# Patient Record
Sex: FEMALE | Race: WHITE | Employment: UNEMPLOYED | URBAN - NONMETROPOLITAN AREA
[De-identification: names, ages, dates, MRNs, and addresses within clinical notes are randomized per-mention and may not be internally consistent; named-entity substitution may affect disease eponyms.]

---

## 2021-05-06 ENCOUNTER — APPOINTMENT (OUTPATIENT)
Dept: GENERAL RADIOLOGY | Age: 41
End: 2021-05-06
Payer: COMMERCIAL

## 2021-05-06 ENCOUNTER — APPOINTMENT (OUTPATIENT)
Dept: CT IMAGING | Age: 41
End: 2021-05-06
Payer: COMMERCIAL

## 2021-05-06 ENCOUNTER — HOSPITAL ENCOUNTER (EMERGENCY)
Age: 41
Discharge: HOME | End: 2021-05-06
Attending: EMERGENCY MEDICINE
Payer: COMMERCIAL

## 2021-05-06 VITALS
HEART RATE: 67 BPM | OXYGEN SATURATION: 100 % | DIASTOLIC BLOOD PRESSURE: 94 MMHG | HEIGHT: 67 IN | BODY MASS INDEX: 29.82 KG/M2 | RESPIRATION RATE: 16 BRPM | WEIGHT: 190 LBS | TEMPERATURE: 98.1 F | SYSTOLIC BLOOD PRESSURE: 140 MMHG

## 2021-05-06 DIAGNOSIS — G51.0 BELL'S PALSY: Primary | ICD-10-CM

## 2021-05-06 LAB
ALBUMIN SERPL-MCNC: 4.6 G/DL (ref 3.5–5.1)
ALP BLD-CCNC: 61 U/L (ref 38–126)
ALT SERPL-CCNC: 11 U/L (ref 11–66)
ANION GAP SERPL CALCULATED.3IONS-SCNC: 10 MEQ/L (ref 8–16)
AST SERPL-CCNC: 16 U/L (ref 5–40)
BASOPHILS # BLD: 0.6 %
BASOPHILS ABSOLUTE: 0 THOU/MM3 (ref 0–0.1)
BILIRUB SERPL-MCNC: 0.3 MG/DL (ref 0.3–1.2)
BILIRUBIN DIRECT: < 0.2 MG/DL (ref 0–0.3)
BUN BLDV-MCNC: 10 MG/DL (ref 7–22)
CALCIUM SERPL-MCNC: 9.5 MG/DL (ref 8.5–10.5)
CHLORIDE BLD-SCNC: 103 MEQ/L (ref 98–111)
CHP ED QC CHECK: NORMAL
CO2: 25 MEQ/L (ref 23–33)
CREAT SERPL-MCNC: 0.8 MG/DL (ref 0.4–1.2)
EOSINOPHIL # BLD: 1.7 %
EOSINOPHILS ABSOLUTE: 0.1 THOU/MM3 (ref 0–0.4)
ERYTHROCYTE [DISTWIDTH] IN BLOOD BY AUTOMATED COUNT: 13.4 % (ref 11.5–14.5)
ERYTHROCYTE [DISTWIDTH] IN BLOOD BY AUTOMATED COUNT: 42.5 FL (ref 35–45)
GFR SERPL CREATININE-BSD FRML MDRD: 79 ML/MIN/1.73M2
GLUCOSE BLD-MCNC: 143 MG/DL
GLUCOSE BLD-MCNC: 143 MG/DL (ref 70–108)
GLUCOSE BLD-MCNC: 143 MG/DL (ref 70–108)
HCT VFR BLD CALC: 41.8 % (ref 37–47)
HEMOGLOBIN: 13.1 GM/DL (ref 12–16)
IMMATURE GRANS (ABS): 0.03 THOU/MM3 (ref 0–0.07)
IMMATURE GRANULOCYTES: 0.5 %
LYMPHOCYTES # BLD: 31.3 %
LYMPHOCYTES ABSOLUTE: 2 THOU/MM3 (ref 1–4.8)
MCH RBC QN AUTO: 27.2 PG (ref 26–33)
MCHC RBC AUTO-ENTMCNC: 31.3 GM/DL (ref 32.2–35.5)
MCV RBC AUTO: 86.9 FL (ref 81–99)
MONOCYTES # BLD: 6.7 %
MONOCYTES ABSOLUTE: 0.4 THOU/MM3 (ref 0.4–1.3)
NUCLEATED RED BLOOD CELLS: 0 /100 WBC
OSMOLALITY CALCULATION: 277.2 MOSMOL/KG (ref 275–300)
PLATELET # BLD: 255 THOU/MM3 (ref 130–400)
PMV BLD AUTO: 10 FL (ref 9.4–12.4)
POTASSIUM REFLEX MAGNESIUM: 4.3 MEQ/L (ref 3.5–5.2)
RBC # BLD: 4.81 MILL/MM3 (ref 4.2–5.4)
SEG NEUTROPHILS: 59.2 %
SEGMENTED NEUTROPHILS ABSOLUTE COUNT: 3.8 THOU/MM3 (ref 1.8–7.7)
SODIUM BLD-SCNC: 138 MEQ/L (ref 135–145)
TOTAL PROTEIN: 7.4 G/DL (ref 6.1–8)
TROPONIN T: < 0.01 NG/ML
WBC # BLD: 6.4 THOU/MM3 (ref 4.8–10.8)

## 2021-05-06 PROCEDURE — 80048 BASIC METABOLIC PNL TOTAL CA: CPT

## 2021-05-06 PROCEDURE — 82948 REAGENT STRIP/BLOOD GLUCOSE: CPT

## 2021-05-06 PROCEDURE — 36415 COLL VENOUS BLD VENIPUNCTURE: CPT

## 2021-05-06 PROCEDURE — 6370000000 HC RX 637 (ALT 250 FOR IP): Performed by: STUDENT IN AN ORGANIZED HEALTH CARE EDUCATION/TRAINING PROGRAM

## 2021-05-06 PROCEDURE — 99285 EMERGENCY DEPT VISIT HI MDM: CPT

## 2021-05-06 PROCEDURE — 70450 CT HEAD/BRAIN W/O DYE: CPT

## 2021-05-06 PROCEDURE — 71045 X-RAY EXAM CHEST 1 VIEW: CPT

## 2021-05-06 PROCEDURE — 85025 COMPLETE CBC W/AUTO DIFF WBC: CPT

## 2021-05-06 PROCEDURE — 84484 ASSAY OF TROPONIN QUANT: CPT

## 2021-05-06 PROCEDURE — 2580000003 HC RX 258: Performed by: EMERGENCY MEDICINE

## 2021-05-06 PROCEDURE — 93005 ELECTROCARDIOGRAM TRACING: CPT | Performed by: EMERGENCY MEDICINE

## 2021-05-06 PROCEDURE — 80076 HEPATIC FUNCTION PANEL: CPT

## 2021-05-06 RX ORDER — ACYCLOVIR 400 MG/1
400 TABLET ORAL
Qty: 50 TABLET | Refills: 0 | Status: SHIPPED | OUTPATIENT
Start: 2021-05-06 | End: 2021-05-16

## 2021-05-06 RX ORDER — MINERAL OIL AND PETROLATUM 150; 830 MG/G; MG/G
OINTMENT OPHTHALMIC ONCE
Status: COMPLETED | OUTPATIENT
Start: 2021-05-06 | End: 2021-05-06

## 2021-05-06 RX ORDER — PREDNISONE 20 MG/1
60 TABLET ORAL DAILY
Qty: 21 TABLET | Refills: 0 | Status: SHIPPED | OUTPATIENT
Start: 2021-05-06 | End: 2021-05-13

## 2021-05-06 RX ORDER — SODIUM CHLORIDE 9 MG/ML
1000 INJECTION, SOLUTION INTRAVENOUS CONTINUOUS
Status: DISCONTINUED | OUTPATIENT
Start: 2021-05-06 | End: 2021-05-06 | Stop reason: HOSPADM

## 2021-05-06 RX ADMIN — MINERAL OIL AND WHITE PETROLATUM: 150; 830 OINTMENT OPHTHALMIC at 19:21

## 2021-05-06 RX ADMIN — SODIUM CHLORIDE 1000 ML: 9 INJECTION, SOLUTION INTRAVENOUS at 16:39

## 2021-05-06 ASSESSMENT — ENCOUNTER SYMPTOMS
NAUSEA: 0
EYE DISCHARGE: 0
RHINORRHEA: 0
SHORTNESS OF BREATH: 0
SINUS PRESSURE: 0
VOMITING: 0
ABDOMINAL DISTENTION: 0
ABDOMINAL PAIN: 0
EYE ITCHING: 0
COLOR CHANGE: 0
CHEST TIGHTNESS: 0
DIARRHEA: 0
EYE REDNESS: 0
EYE PAIN: 0
SINUS PAIN: 0

## 2021-05-06 NOTE — ED PROVIDER NOTES
[facility] ENCOUNTER          Pt Name: Lavonne Li  MRN: [id number]  [place] 1980  Date of evaluation: 5/6/2021  Treating Resident Physician: Trinity Castro DO  Supervising Physician: Sabrina Choi DO    CHIEF COMPLAINT       Chief Complaint   Patient presents with    Facial Droop     History obtained from the patient. HISTORY OF PRESENT ILLNESS    HPI  Lavonne Li is a 38 y.o. female who presents to the emergency department for evaluation of facial droop. The patient states that she developed sudden onset of left-sided facial droop which started at 1530 earlier today after taking a shower. She reports that she also has associated symptoms of numbness to the left side of her face. The patient became concerned that that she could be having a stroke and so presented to the emergency department for further evaluation. She reports that she has had no speech difficulties or weakness. Denies a history of HSV or antecedent rash or HSV infection. Denies abnormal hearing or abnormal taste. She reports that she is currently on her menstrual cycle and is not concerned that she could be pregnant. The patient has no other acute complaints at this time. REVIEW OF SYSTEMS   Review of Systems   Constitutional: Negative for fever. HENT: Negative for rhinorrhea, sinus pressure and sinus pain. Eyes: Negative for pain, discharge, redness, itching and visual disturbance. Respiratory: Negative for chest tightness and shortness of breath. Cardiovascular: Negative for chest pain. Gastrointestinal: Negative for abdominal distention, abdominal pain, diarrhea, nausea and vomiting. Genitourinary: Negative for difficulty urinating, dysuria, frequency, hematuria and urgency. Musculoskeletal: Negative for arthralgias. Skin: Negative for color change and pallor. Neurological: Positive for facial asymmetry.  Negative for dizziness, speech difficulty, weakness, light-headedness and headaches. PAST MEDICAL AND SURGICAL HISTORY     Past Medical History:   Diagnosis Date    GERD (gastroesophageal reflux disease)     Headache(784.0)     Obesity      Past Surgical History:   Procedure Laterality Date    EYE SURGERY           MEDICATIONS     Current Facility-Administered Medications:     0.9 % sodium chloride infusion, 1,000 mL, Intravenous, Continuous, Luzmaria Estrada DO, Stopped at 05/06/21 1930    Current Outpatient Medications:     VITAMIN D PO, Take by mouth, Disp: , Rfl:     predniSONE (DELTASONE) 20 MG tablet, Take 3 tablets by mouth daily for 7 days, Disp: 21 tablet, Rfl: 0    acyclovir (ZOVIRAX) 400 MG tablet, Take 1 tablet by mouth 5 times daily for 10 days, Disp: 50 tablet, Rfl: 0    Multiple Vitamin (MULTI-VITAMIN PO), Take  by mouth daily. , Disp: , Rfl:     FISH OIL, 1 capsule by Does not apply route.  , Disp: , Rfl:     ascorbic acid (VITAMIN C) 500 MG tablet, Take 500 mg by mouth daily. , Disp: , Rfl:       SOCIAL HISTORY     Social History     Social History Narrative    ** Merged History Encounter **          Social History     Tobacco Use    Smoking status: Never Smoker    Smokeless tobacco: Never Used   Substance Use Topics    Alcohol use: No    Drug use: No         ALLERGIES     Allergies   Allergen Reactions    Latex     Latex Rash    Codeine     Codeine          FAMILY HISTORY     Family History   Problem Relation Age of Onset    High Blood Pressure Mother     Diabetes Mother     Cancer Father 79        Lung cancer         PREVIOUS RECORDS   Previous records reviewed:         PHYSICAL EXAM     ED Triage Vitals [05/06/21 1619]   BP Temp Temp src Pulse Resp SpO2 Height Weight   163/103 98.1F -- 69 16 100 % 5' 7\" (1.702 m) 190 lb (86.2 kg)     Initial vital signs and nursing assessment reviewed and normal. Body mass index is 29.76 kg/m². Pulsoximetry is normal per my interpretation.     Additional Vital Signs:  Vitals:    05/06/21 1759   BP: (!) 140/94   Pulse: 67   Resp: 16   Temp:    SpO2:        Physical Exam  Constitutional:       General: She is not in acute distress. Appearance: Normal appearance. She is not ill-appearing. HENT:      Head: Normocephalic and atraumatic. Right Ear: Tympanic membrane, ear canal and external ear normal.      Left Ear: Tympanic membrane, ear canal and external ear normal.      Nose: Nose normal. No congestion or rhinorrhea. Mouth/Throat:      Mouth: Mucous membranes are moist.      Pharynx: Oropharynx is clear. No oropharyngeal exudate or posterior oropharyngeal erythema. Eyes:      Extraocular Movements: Extraocular movements intact. Conjunctiva/sclera: Conjunctivae normal.      Pupils: Pupils are equal, round, and reactive to light. Neck:      Musculoskeletal: Normal range of motion and neck supple. Cardiovascular:      Rate and Rhythm: Normal rate and regular rhythm. Pulses: Normal pulses. Heart sounds: Normal heart sounds. Pulmonary:      Effort: Pulmonary effort is normal.      Breath sounds: Normal breath sounds. Abdominal:      General: Abdomen is flat. There is no distension. Palpations: Abdomen is soft. Tenderness: There is no abdominal tenderness. Musculoskeletal: Normal range of motion. General: No swelling or tenderness. Right lower leg: No edema. Left lower leg: No edema. Skin:     General: Skin is warm and dry. Neurological:      Mental Status: She is alert and oriented to person, place, and time. Cranial Nerves: Cranial nerve deficit present. Comments: The patient has left-sided facial droop and reports numbness to the V1 and V2 distributions of cranial nerve V. She has 5/5 upper and lower extremity muscle strength. Normal pulse, motor, and sensation of bilateral upper and lower extremities. Patient talks with a normal pattern of speech and is not slurring her words.   No pronator drift or lower extremity limb drift. Patient has no finger-nose ataxia. She is able to raise her eyebrows bilaterally, however is less able to raise her eyebrow on the left side. Patient less able to squeeze her eyelids shut on the left compared to the right. MEDICAL DECISION MAKING   Initial Assessment:   3 70-year-old female with no reported past medical history, non-smoker, who presents to the emergency department for sudden onset left-sided facial droop. She has no other concerning signs or symptoms of acute CVA, and physical exam is consistent with Bell's palsy. 2. Differential diagnosis includes but is not limited to: Bell's palsy, electrolyte derangement, unlikely intracerebral aneurysm or CVA  Plan:    IV, labs   Code stroke work-up        ED RESULTS   Laboratory results:  Labs Reviewed   CBC WITH AUTO DIFFERENTIAL - Abnormal; Notable for the following components:       Result Value    MCHC 31.3 (*)     All other components within normal limits   BASIC METABOLIC PANEL W/ REFLEX TO MG FOR LOW K - Abnormal; Notable for the following components:    Glucose 143 (*)     All other components within normal limits   GLOMERULAR FILTRATION RATE, ESTIMATED - Abnormal; Notable for the following components:    Est, Glom Filt Rate 79 (*)     All other components within normal limits   POCT GLUCOSE - Abnormal; Notable for the following components:    POC Glucose 143 (*)     All other components within normal limits   POCT GLUCOSE - Normal   HEPATIC FUNCTION PANEL   TROPONIN   ANION GAP   OSMOLALITY       Radiologic studies results:  XR CHEST PORTABLE   Final Result   Normal mobile chest.            **This report has been created using voice recognition software. It may contain minor errors which are inherent in voice recognition technology. **      Final report electronically signed by Dr. Rozina Flanagan on 5/6/2021 4:46 PM      CT HEAD WO CONTRAST (CODE STROKE)   Final Result   Normal noncontrast CT of and electronically transcribed, and parts may have been transcribed with the assistance of an ED scribe. The transcription may contain errors not detected in proofreading. Please refer to my supervising physician's documentation if my documentation differs.     Electronically Signed: Charanjit Boucher, 05/06/21, 9:11 PM         Charanjit Boucher DO  Resident  05/06/21 0539

## 2021-05-08 LAB
EKG ATRIAL RATE: 62 BPM
EKG P AXIS: 52 DEGREES
EKG P-R INTERVAL: 140 MS
EKG Q-T INTERVAL: 410 MS
EKG QRS DURATION: 92 MS
EKG QTC CALCULATION (BAZETT): 416 MS
EKG R AXIS: 25 DEGREES
EKG T AXIS: 35 DEGREES
EKG VENTRICULAR RATE: 62 BPM

## 2021-05-08 PROCEDURE — 93010 ELECTROCARDIOGRAM REPORT: CPT | Performed by: INTERNAL MEDICINE

## 2024-03-10 ENCOUNTER — HOSPITAL ENCOUNTER (EMERGENCY)
Age: 44
Discharge: HOME | End: 2024-03-10
Payer: COMMERCIAL

## 2024-03-10 VITALS
TEMPERATURE: 98.5 F | HEART RATE: 96 BPM | DIASTOLIC BLOOD PRESSURE: 106 MMHG | OXYGEN SATURATION: 99 % | WEIGHT: 195 LBS | BODY MASS INDEX: 30.54 KG/M2 | SYSTOLIC BLOOD PRESSURE: 161 MMHG | RESPIRATION RATE: 18 BRPM

## 2024-03-10 DIAGNOSIS — H65.192 OTHER NON-RECURRENT ACUTE NONSUPPURATIVE OTITIS MEDIA OF LEFT EAR: ICD-10-CM

## 2024-03-10 DIAGNOSIS — H60.502 ACUTE NONINFECTIVE OTITIS EXTERNA OF LEFT EAR, UNSPECIFIED TYPE: Primary | ICD-10-CM

## 2024-03-10 PROCEDURE — 99213 OFFICE O/P EST LOW 20 MIN: CPT

## 2024-03-10 PROCEDURE — 99203 OFFICE O/P NEW LOW 30 MIN: CPT | Performed by: NURSE PRACTITIONER

## 2024-03-10 RX ORDER — AMOXICILLIN AND CLAVULANATE POTASSIUM 875; 125 MG/1; MG/1
1 TABLET, FILM COATED ORAL 2 TIMES DAILY
Qty: 14 TABLET | Refills: 0 | Status: SHIPPED | OUTPATIENT
Start: 2024-03-10 | End: 2024-03-17

## 2024-03-10 NOTE — ED PROVIDER NOTES
[facility] URGENT CARE  UrgentCare Encounter      CHIEFCOMPLAINT       Chief Complaint   Patient presents with    Otalgia    Facial Swelling       Nurses Notes reviewed and I agree except as noted in the HPI.  HISTORY OF PRESENT ILLNESS   Renae Greene is a 43 y.o. female who presents to urgent care with complaint of left-sided ear pain and facial swelling.  Symptom onset was approximately 1 to 2 days ago.  She has a discharge in the left ear as well.    REVIEW OF SYSTEMS     Review of Systems   HENT:  Positive for ear discharge and ear pain.        PAST MEDICAL HISTORY         Diagnosis Date    GERD (gastroesophageal reflux disease)     Headache(784.0)     Obesity        SURGICAL HISTORY     Patient  has a past surgical history that includes Eye surgery.    CURRENT MEDICATIONS       Discharge Medication List as of 3/10/2024  4:10 PM        CONTINUE these medications which have NOT CHANGED    Details   VITAMIN D PO Take by mouthHistorical Med      FISH OIL 1 capsule by Does not apply route.        ascorbic acid (VITAMIN C) 500 MG tablet Take 500 mg by mouth daily.        Multiple Vitamin (MULTI-VITAMIN PO) Take  by mouth daily.               ALLERGIES     Patient is is allergic to latex, latex, codeine, and codeine.    FAMILY HISTORY     Patient'sfamily history includes Cancer (age of onset: 70) in her father; Diabetes in her mother; High Blood Pressure in her mother.    SOCIAL HISTORY     Patient  reports that she has never smoked. She has never used smokeless tobacco. She reports that she does not drink alcohol and does not use drugs.    PHYSICAL EXAM     ED TRIAGE VITALS  BP: (!) 161/106, Temp: 98.5 °F (36.9 °C), Pulse: 96, Respirations: 18, SpO2: 99 %  Physical Exam  Vitals and nursing note reviewed.   Constitutional:       General: She is not in acute distress.     Appearance: Normal appearance. She is not ill-appearing or toxic-appearing.   HENT:      Head: Normocephalic and atraumatic.

## 2024-03-10 NOTE — DISCHARGE INSTRUCTIONS
Medications as prescribed.    Make sure to drink plenty fluids.    Report to ER with new or severe symptoms.

## 2024-03-10 NOTE — ED TRIAGE NOTES
Renae arrives to room with complaint of bilateral ear pain and facial swelling symptoms started 2 days ago.